# Patient Record
Sex: FEMALE | URBAN - METROPOLITAN AREA
[De-identification: names, ages, dates, MRNs, and addresses within clinical notes are randomized per-mention and may not be internally consistent; named-entity substitution may affect disease eponyms.]

---

## 2022-09-06 ENCOUNTER — OFFICE VISIT (OUTPATIENT)
Dept: URBAN - METROPOLITAN AREA CLINIC 76 | Facility: CLINIC | Age: 52
End: 2022-09-06
Payer: COMMERCIAL

## 2022-09-06 DIAGNOSIS — H52.4 PRESBYOPIA: ICD-10-CM

## 2022-09-06 DIAGNOSIS — H04.123 DRY EYE SYNDROME OF BILATERAL LACRIMAL GLANDS: Primary | ICD-10-CM

## 2022-09-06 PROCEDURE — 99203 OFFICE O/P NEW LOW 30 MIN: CPT | Performed by: OPTOMETRIST

## 2022-09-06 ASSESSMENT — KERATOMETRY
OS: 42.13
OD: 41.63

## 2022-09-06 ASSESSMENT — VISUAL ACUITY
OS: 20/20
OD: 20/20

## 2022-09-06 ASSESSMENT — INTRAOCULAR PRESSURE
OD: 20
OS: 18

## 2022-09-06 NOTE — IMPRESSION/PLAN
Impression: Presbyopia: H52.4. Bilateral. Plan: Sample of Vuity given today. advised pt to call if she likes Vuity so we can send in Rx for her.